# Patient Record
Sex: MALE | URBAN - METROPOLITAN AREA
[De-identification: names, ages, dates, MRNs, and addresses within clinical notes are randomized per-mention and may not be internally consistent; named-entity substitution may affect disease eponyms.]

---

## 2019-11-14 ENCOUNTER — APPOINTMENT (RX ONLY)
Dept: URBAN - METROPOLITAN AREA CLINIC 2 | Facility: CLINIC | Age: 69
Setting detail: DERMATOLOGY
End: 2019-11-14

## 2019-11-14 DIAGNOSIS — L82.0 INFLAMED SEBORRHEIC KERATOSIS: ICD-10-CM

## 2019-11-14 DIAGNOSIS — L82.1 OTHER SEBORRHEIC KERATOSIS: ICD-10-CM

## 2019-11-14 PROCEDURE — 11303 SHAVE SKIN LESION >2.0 CM: CPT

## 2019-11-14 PROCEDURE — ? COUNSELING

## 2019-11-14 PROCEDURE — ? SHAVE REMOVAL

## 2019-11-14 PROCEDURE — 99202 OFFICE O/P NEW SF 15 MIN: CPT | Mod: 25

## 2019-11-14 ASSESSMENT — LOCATION SIMPLE DESCRIPTION DERM
LOCATION SIMPLE: ABDOMEN
LOCATION SIMPLE: LEFT UPPER BACK

## 2019-11-14 ASSESSMENT — LOCATION DETAILED DESCRIPTION DERM
LOCATION DETAILED: LEFT LATERAL ABDOMEN
LOCATION DETAILED: LEFT MID-UPPER BACK

## 2019-11-14 ASSESSMENT — LOCATION ZONE DERM: LOCATION ZONE: TRUNK

## 2019-11-14 NOTE — PROCEDURE: SHAVE REMOVAL
Consent: Verbal consent was obtained. Risks were reviewed with patient including but not limited to scarring, bleeding, infection, scabbing, incomplete removal, and recurrence
Medical Necessity Clause: The following procedure was medically necessary due to:  inflamed and irritated
Bill 81374 For Specimen Handling/Conveyance To Laboratory?: no
Size Of Lesion In Cm (Required): 2.4
Anesthesia Volume In Cc: 1
Wound Care: Vaseline
Billing Type: Patient Bill
Render Post-Care Instructions In Note?: yes
Anesthesia Type: 1% lidocaine with epinephrine
X Size Of Lesion In Cm (Optional): 0
Hemostasis: Drysol
Biopsy Method: double edge Personna blade
Medical Necessity Information: It is in your best interest to select a reason for this procedure from the list below. All of these items fulfill various CMS LCD requirements except the new and changing color options.
Post-Care Instructions: Keep lesion covered and dried for the next 24 hours. Then wash with warm soapy water and keep ointment on the lesion (Vasaline or Polysporin) to avoid scabbing and crusting
Detail Level: Detailed
Lab: [id number]
Notification Instructions: Patient will be notified of results of pathology within three weeks